# Patient Record
Sex: FEMALE | Race: WHITE | NOT HISPANIC OR LATINO | Employment: FULL TIME | URBAN - METROPOLITAN AREA
[De-identification: names, ages, dates, MRNs, and addresses within clinical notes are randomized per-mention and may not be internally consistent; named-entity substitution may affect disease eponyms.]

---

## 2017-04-13 ENCOUNTER — HISTORICAL (OUTPATIENT)
Dept: INTERNAL MEDICINE | Facility: CLINIC | Age: 42
End: 2017-04-13

## 2017-05-01 ENCOUNTER — HISTORICAL (OUTPATIENT)
Dept: ADMINISTRATIVE | Facility: HOSPITAL | Age: 42
End: 2017-05-01

## 2017-05-03 ENCOUNTER — HISTORICAL (OUTPATIENT)
Dept: SURGERY | Facility: HOSPITAL | Age: 42
End: 2017-05-03

## 2017-05-03 LAB
B-HCG SERPL QL: NEGATIVE
INR PPP: 0.96 (ref 0.9–1.2)
PROTHROMBIN TIME: 12.6 SECOND(S) (ref 11.9–14.4)

## 2017-06-07 ENCOUNTER — INITIAL CONSULT (OUTPATIENT)
Dept: INTERVENTIONAL RADIOLOGY/VASCULAR | Facility: CLINIC | Age: 42
End: 2017-06-07
Payer: COMMERCIAL

## 2017-06-07 VITALS
BODY MASS INDEX: 21.16 KG/M2 | HEIGHT: 65 IN | DIASTOLIC BLOOD PRESSURE: 90 MMHG | SYSTOLIC BLOOD PRESSURE: 131 MMHG | WEIGHT: 127 LBS | HEART RATE: 71 BPM

## 2017-06-07 DIAGNOSIS — Z86.718 HISTORY OF DVT OF LOWER EXTREMITY: ICD-10-CM

## 2017-06-07 DIAGNOSIS — Z95.828 PRESENCE OF IVC FILTER: Primary | ICD-10-CM

## 2017-06-07 DIAGNOSIS — K50.90 CROHN'S DISEASE IN REMISSION: ICD-10-CM

## 2017-06-07 PROCEDURE — 99999 PR PBB SHADOW E&M-EST. PATIENT-LVL III: CPT | Mod: PBBFAC,,,

## 2017-06-07 PROCEDURE — 99204 OFFICE O/P NEW MOD 45 MIN: CPT | Mod: S$GLB,,, | Performed by: NURSE PRACTITIONER

## 2017-06-07 RX ORDER — NAPROXEN SODIUM 220 MG/1
81 TABLET, FILM COATED ORAL DAILY
COMMUNITY

## 2017-06-07 NOTE — PROGRESS NOTES
Subjective:       Patient ID: Sofia Vásquez is a 41 y.o. female.    Chief Complaint: Presence of IVC filter; History of Left lower extremity DVT    Patient in today for an initial consult for an IVC filter retrieval. Patient was referred to Dr. Gordillo by Dr. Channing Avalos at [facility] after a failed IVC filter retrieval on 05/03/2017. Patient reports that the IVC filter was placed in April 2014 after she had a lower GI bleed and was subsequently diagnosed with C. Diff and Crohn's disease. She was ill and inactive for 3 months during that time. Her Crohn's was treated with Remicade and Humira for 1 year and has not had any flare ups to date. During the time of her illness, after being in a prolonged after yoga pose for approximately 20 minute she developed severe left LE swelling associated with SOB. She was not anticoagulated at that time due to the lower GI bleeding. She has no complaints today. She currently denies LE swelling, pelvic pain or pressure, SOB or abdominal pain. She is currently on ASA 81 mg daily and has refused the recommended Eliquis therapy after failed IVC filter retrieval attempt last month.She is here today with her mother.       Review of Systems   Constitutional: Negative for activity change, appetite change, chills, fatigue, fever and unexpected weight change.   HENT: Negative.    Eyes: Negative.    Respiratory: Negative for apnea, cough, choking, chest tightness and shortness of breath.    Cardiovascular: Negative for chest pain, palpitations and leg swelling.   Gastrointestinal: Negative for abdominal distention, abdominal pain, blood in stool, constipation, diarrhea, nausea and vomiting.   Endocrine: Negative.    Genitourinary: Negative for difficulty urinating, dyspareunia, dysuria, frequency, menstrual problem, pelvic pain and urgency.   Musculoskeletal: Negative.    Neurological: Negative for dizziness, tremors, seizures, syncope, speech difficulty,  weakness, light-headedness, numbness and headaches.   Hematological: Does not bruise/bleed easily.       Objective:      Physical Exam   Constitutional: She is oriented to person, place, and time. She appears well-developed and well-nourished.   HENT:   Head: Normocephalic and atraumatic.   Eyes: EOM are normal. Pupils are equal, round, and reactive to light.   Neck: Normal range of motion. Neck supple. No JVD present. No tracheal deviation present. No thyromegaly present.   Cardiovascular: Normal rate, regular rhythm, normal heart sounds and intact distal pulses.  Exam reveals no gallop and no friction rub.    No murmur heard.  Pulmonary/Chest: Effort normal and breath sounds normal. She has no wheezes. She has no rales.   Abdominal: Soft. Bowel sounds are normal. She exhibits no distension and no mass. There is no tenderness. There is no guarding.   Musculoskeletal: Normal range of motion.   Lymphadenopathy:     She has no cervical adenopathy.   Neurological: She is alert and oriented to person, place, and time. She has normal reflexes.   Skin: Skin is warm and dry.   Psychiatric: She has a normal mood and affect. Her behavior is normal. Judgment and thought content normal.       Assessment:       1. Presence of IVC filter    2. History of DVT of lower extremity        Plan:     Dr. Gordillo came in to speak with the patient. IVC filter retrieval procedure discussed in detail (diagrams drawn) with the patient including risks, benefits, potential complications, usual pre and post procedure course including the need for general anesthesia, as well as the possible need for overnight hospital stay. Dr. Gordillo is recommending CTA abdomen and pelvis (to assess stenosis of the IVC above the filter) and an US of the BLE veins prior to moving forward with the filter retrieval. Patient will have the imaging done in [place]. Orders entered,signed, and printed and given to the patient. She will mail the imaging discs to IR  once both have been done. Clinic contact information provided. Patient information verified in EPIC.

## 2017-07-06 ENCOUNTER — HISTORICAL (OUTPATIENT)
Dept: GASTROENTEROLOGY | Facility: CLINIC | Age: 42
End: 2017-07-06

## 2017-07-06 LAB
ABS NEUT (OLG): 4.14 X10(3)/MCL (ref 2.1–9.2)
ALBUMIN SERPL-MCNC: 4 GM/DL (ref 3.4–5)
ALBUMIN/GLOB SERPL: 1 RATIO (ref 1–2)
ALP SERPL-CCNC: 92 UNIT/L (ref 45–117)
ALT SERPL-CCNC: 28 UNIT/L (ref 12–78)
APTT PPP: 26.2 SECOND(S) (ref 23.3–37)
AST SERPL-CCNC: 26 UNIT/L (ref 15–37)
BASOPHILS # BLD AUTO: 0.04 X10(3)/MCL
BASOPHILS NFR BLD AUTO: 1 % (ref 0–1)
BILIRUB SERPL-MCNC: 0.4 MG/DL (ref 0.2–1)
BILIRUBIN DIRECT+TOT PNL SERPL-MCNC: <0.1 MG/DL
BILIRUBIN DIRECT+TOT PNL SERPL-MCNC: >0.3 MG/DL
BUN SERPL-MCNC: 12 MG/DL (ref 7–18)
CALCIUM SERPL-MCNC: 9.2 MG/DL (ref 8.5–10.1)
CHLORIDE SERPL-SCNC: 105 MMOL/L (ref 98–107)
CHOLEST SERPL-MCNC: 210 MG/DL
CHOLEST/HDLC SERPL: 2.4 {RATIO} (ref 0–4.4)
CO2 SERPL-SCNC: 28 MMOL/L (ref 21–32)
CREAT SERPL-MCNC: 0.8 MG/DL (ref 0.6–1.3)
EOSINOPHIL # BLD AUTO: 0.13 X10(3)/MCL
EOSINOPHIL NFR BLD AUTO: 2 % (ref 0–5)
ERYTHROCYTE [DISTWIDTH] IN BLOOD BY AUTOMATED COUNT: 13.2 % (ref 11.5–14.5)
GLOBULIN SER-MCNC: 3.8 GM/ML (ref 2.3–3.5)
GLUCOSE SERPL-MCNC: 93 MG/DL (ref 74–106)
HCT VFR BLD AUTO: 39.6 % (ref 35–46)
HDLC SERPL-MCNC: 87 MG/DL
HGB BLD-MCNC: 13 GM/DL (ref 12–16)
IMM GRANULOCYTES # BLD AUTO: 0.01 10*3/UL
IMM GRANULOCYTES NFR BLD AUTO: 0 %
INR PPP: 0.93 (ref 0.9–1.2)
LDLC SERPL CALC-MCNC: 112 MG/DL (ref 0–130)
LYMPHOCYTES # BLD AUTO: 1.35 X10(3)/MCL
LYMPHOCYTES NFR BLD AUTO: 22 % (ref 15–40)
MCH RBC QN AUTO: 29 PG (ref 26–34)
MCHC RBC AUTO-ENTMCNC: 32.8 GM/DL (ref 31–37)
MCV RBC AUTO: 88.2 FL (ref 80–100)
MONOCYTES # BLD AUTO: 0.55 X10(3)/MCL
MONOCYTES NFR BLD AUTO: 9 % (ref 4–12)
NEUTROPHILS # BLD AUTO: 4.14 X10(3)/MCL
NEUTROPHILS NFR BLD AUTO: 67 X10(3)/MCL
PLATELET # BLD AUTO: 346 X10(3)/MCL (ref 130–400)
PMV BLD AUTO: 9.5 FL (ref 7.4–10.4)
POTASSIUM SERPL-SCNC: 4.4 MMOL/L (ref 3.5–5.1)
PROT SERPL-MCNC: 7.8 GM/DL (ref 6.4–8.2)
PROTHROMBIN TIME: 12.3 SECOND(S) (ref 11.9–14.4)
RBC # BLD AUTO: 4.49 X10(6)/MCL (ref 4–5.2)
SODIUM SERPL-SCNC: 140 MMOL/L (ref 136–145)
TRIGL SERPL-MCNC: 56 MG/DL
TSH SERPL-ACNC: 1.33 MIU/L (ref 0.36–3.74)
VLDLC SERPL CALC-MCNC: 11 MG/DL
WBC # SPEC AUTO: 6.2 X10(3)/MCL (ref 4.5–11)

## 2017-07-19 ENCOUNTER — HOSPITAL ENCOUNTER (OUTPATIENT)
Dept: MEDSURG UNIT | Facility: HOSPITAL | Age: 42
End: 2017-07-20
Attending: RADIOLOGY | Admitting: RADIOLOGY

## 2017-07-19 LAB
ABS NEUT (OLG): 8.87 X10(3)/MCL (ref 2.1–9.2)
APTT PPP: 47.7 SECOND(S) (ref 23.3–37)
B-HCG SERPL QL: NEGATIVE
BASOPHILS # BLD AUTO: 0.05 X10(3)/MCL
BASOPHILS NFR BLD AUTO: 0 % (ref 0–1)
CROSSMATCH INTERPRETATION: NORMAL
EOSINOPHIL # BLD AUTO: 0.04 X10(3)/MCL
EOSINOPHIL NFR BLD AUTO: 0 % (ref 0–5)
ERYTHROCYTE [DISTWIDTH] IN BLOOD BY AUTOMATED COUNT: 13.5 % (ref 11.5–14.5)
GROUP & RH: NORMAL
HCT VFR BLD AUTO: 32.6 % (ref 35–46)
HCT VFR BLD AUTO: 33.6 % (ref 35–46)
HGB BLD-MCNC: 10.9 GM/DL (ref 12–16)
HGB BLD-MCNC: 11.1 GM/DL (ref 12–16)
IMM GRANULOCYTES # BLD AUTO: 0.03 10*3/UL
IMM GRANULOCYTES NFR BLD AUTO: 0 %
INR PPP: 1.13 (ref 0.9–1.2)
LYMPHOCYTES # BLD AUTO: 1.09 X10(3)/MCL
LYMPHOCYTES NFR BLD AUTO: 10 % (ref 15–40)
MCH RBC QN AUTO: 29.4 PG (ref 26–34)
MCHC RBC AUTO-ENTMCNC: 33.4 GM/DL (ref 31–37)
MCV RBC AUTO: 87.9 FL (ref 80–100)
MONOCYTES # BLD AUTO: 0.68 X10(3)/MCL
MONOCYTES NFR BLD AUTO: 6 % (ref 4–12)
NEUTROPHILS # BLD AUTO: 8.87 X10(3)/MCL
NEUTROPHILS NFR BLD AUTO: 82 X10(3)/MCL
PLATELET # BLD AUTO: 251 X10(3)/MCL (ref 130–400)
PMV BLD AUTO: 10.1 FL (ref 7.4–10.4)
PRODUCT READY: NORMAL
PROTHROMBIN TIME: 14.3 SECOND(S) (ref 11.9–14.4)
RBC # BLD AUTO: 3.71 X10(6)/MCL (ref 4–5.2)
WBC # SPEC AUTO: 10.8 X10(3)/MCL (ref 4.5–11)

## 2017-07-20 LAB
ABS NEUT (OLG): 9.57 X10(3)/MCL (ref 2.1–9.2)
APTT PPP: 151.7 SECOND(S) (ref 23.3–37)
BASOPHILS # BLD AUTO: 0.04 X10(3)/MCL
BASOPHILS NFR BLD AUTO: 0 % (ref 0–1)
EOSINOPHIL # BLD AUTO: 0.07 X10(3)/MCL
EOSINOPHIL NFR BLD AUTO: 1 % (ref 0–5)
ERYTHROCYTE [DISTWIDTH] IN BLOOD BY AUTOMATED COUNT: 13.6 % (ref 11.5–14.5)
HCT VFR BLD AUTO: 31.3 % (ref 35–46)
HGB BLD-MCNC: 10.3 GM/DL (ref 12–16)
IMM GRANULOCYTES # BLD AUTO: 0.03 10*3/UL
IMM GRANULOCYTES NFR BLD AUTO: 0 %
LYMPHOCYTES # BLD AUTO: 1.42 X10(3)/MCL
LYMPHOCYTES NFR BLD AUTO: 12 % (ref 15–40)
MCH RBC QN AUTO: 29.1 PG (ref 26–34)
MCHC RBC AUTO-ENTMCNC: 32.9 GM/DL (ref 31–37)
MCV RBC AUTO: 88.4 FL (ref 80–100)
MONOCYTES # BLD AUTO: 0.75 X10(3)/MCL
MONOCYTES NFR BLD AUTO: 6 % (ref 4–12)
NEUTROPHILS # BLD AUTO: 9.57 X10(3)/MCL
NEUTROPHILS NFR BLD AUTO: 80 X10(3)/MCL
PLATELET # BLD AUTO: 233 X10(3)/MCL (ref 130–400)
PMV BLD AUTO: 9.7 FL (ref 7.4–10.4)
RBC # BLD AUTO: 3.54 X10(6)/MCL (ref 4–5.2)
WBC # SPEC AUTO: 11.9 X10(3)/MCL (ref 4.5–11)

## 2017-10-10 ENCOUNTER — HISTORICAL (OUTPATIENT)
Dept: INTERNAL MEDICINE | Facility: CLINIC | Age: 42
End: 2017-10-10

## 2017-10-10 LAB
ABS NEUT (OLG): 5.94 X10(3)/MCL (ref 2.1–9.2)
ALBUMIN SERPL-MCNC: 4.8 GM/DL (ref 3.4–5)
ALBUMIN/GLOB SERPL: 1 RATIO (ref 1–2)
ALP SERPL-CCNC: 87 UNIT/L (ref 45–117)
ALT SERPL-CCNC: 33 UNIT/L (ref 12–78)
AST SERPL-CCNC: 36 UNIT/L (ref 15–37)
BASOPHILS # BLD AUTO: 0.06 X10(3)/MCL
BASOPHILS NFR BLD AUTO: 1 % (ref 0–1)
BILIRUB SERPL-MCNC: 0.3 MG/DL (ref 0.2–1)
BILIRUBIN DIRECT+TOT PNL SERPL-MCNC: 0.1 MG/DL
BILIRUBIN DIRECT+TOT PNL SERPL-MCNC: 0.2 MG/DL
BUN SERPL-MCNC: 14 MG/DL (ref 7–18)
CALCIUM SERPL-MCNC: 8.9 MG/DL (ref 8.5–10.1)
CHLORIDE SERPL-SCNC: 104 MMOL/L (ref 98–107)
CO2 SERPL-SCNC: 26 MMOL/L (ref 21–32)
CREAT SERPL-MCNC: 0.8 MG/DL (ref 0.6–1.3)
EOSINOPHIL # BLD AUTO: 0.04 X10(3)/MCL
EOSINOPHIL NFR BLD AUTO: 0 % (ref 0–5)
ERYTHROCYTE [DISTWIDTH] IN BLOOD BY AUTOMATED COUNT: 15.6 % (ref 11.5–14.5)
GLOBULIN SER-MCNC: 3.8 GM/ML (ref 2.3–3.5)
GLUCOSE SERPL-MCNC: 99 MG/DL (ref 74–106)
HCT VFR BLD AUTO: 36.7 % (ref 35–46)
HGB BLD-MCNC: 12.1 GM/DL (ref 12–16)
IMM GRANULOCYTES # BLD AUTO: 0.02 10*3/UL
IMM GRANULOCYTES NFR BLD AUTO: 0 %
INR PPP: 1.07 (ref 0.9–1.2)
LYMPHOCYTES # BLD AUTO: 1.6 X10(3)/MCL
LYMPHOCYTES NFR BLD AUTO: 20 % (ref 15–40)
MCH RBC QN AUTO: 27.6 PG (ref 26–34)
MCHC RBC AUTO-ENTMCNC: 33 GM/DL (ref 31–37)
MCV RBC AUTO: 83.6 FL (ref 80–100)
MONOCYTES # BLD AUTO: 0.53 X10(3)/MCL
MONOCYTES NFR BLD AUTO: 6 % (ref 4–12)
NEUTROPHILS # BLD AUTO: 5.94 X10(3)/MCL
NEUTROPHILS NFR BLD AUTO: 73 X10(3)/MCL
PLATELET # BLD AUTO: 306 X10(3)/MCL (ref 130–400)
PMV BLD AUTO: 10.6 FL (ref 7.4–10.4)
POTASSIUM SERPL-SCNC: 4.1 MMOL/L (ref 3.5–5.1)
PROT SERPL-MCNC: 8.6 GM/DL (ref 6.4–8.2)
PROTHROMBIN TIME: 13.7 SECOND(S) (ref 11.9–14.4)
RBC # BLD AUTO: 4.39 X10(6)/MCL (ref 4–5.2)
SODIUM SERPL-SCNC: 138 MMOL/L (ref 136–145)
WBC # SPEC AUTO: 8.2 X10(3)/MCL (ref 4.5–11)

## 2017-12-21 ENCOUNTER — HISTORICAL (OUTPATIENT)
Dept: INTERNAL MEDICINE | Facility: CLINIC | Age: 42
End: 2017-12-21

## 2017-12-21 LAB
ABS NEUT (OLG): 1.85 X10(3)/MCL (ref 2.1–9.2)
ALBUMIN SERPL-MCNC: 4.2 GM/DL (ref 3.4–5)
ALBUMIN/GLOB SERPL: 1 RATIO (ref 1–2)
ALP SERPL-CCNC: 83 UNIT/L (ref 45–117)
ALT SERPL-CCNC: 30 UNIT/L (ref 12–78)
AST SERPL-CCNC: 27 UNIT/L (ref 15–37)
BASOPHILS # BLD AUTO: 0.05 X10(3)/MCL
BASOPHILS NFR BLD AUTO: 1 % (ref 0–1)
BILIRUB SERPL-MCNC: 0.4 MG/DL (ref 0.2–1)
BILIRUBIN DIRECT+TOT PNL SERPL-MCNC: 0.1 MG/DL
BILIRUBIN DIRECT+TOT PNL SERPL-MCNC: 0.3 MG/DL
BUN SERPL-MCNC: 10 MG/DL (ref 7–18)
CALCIUM SERPL-MCNC: 8.7 MG/DL (ref 8.5–10.1)
CHLORIDE SERPL-SCNC: 106 MMOL/L (ref 98–107)
CHOLEST SERPL-MCNC: 190 MG/DL
CHOLEST/HDLC SERPL: 2 {RATIO} (ref 0–4.4)
CO2 SERPL-SCNC: 27 MMOL/L (ref 21–32)
CREAT SERPL-MCNC: 0.7 MG/DL (ref 0.6–1.3)
EOSINOPHIL # BLD AUTO: 0.1 X10(3)/MCL
EOSINOPHIL NFR BLD AUTO: 3 % (ref 0–5)
ERYTHROCYTE [DISTWIDTH] IN BLOOD BY AUTOMATED COUNT: 16 % (ref 11.5–14.5)
EST. AVERAGE GLUCOSE BLD GHB EST-MCNC: 117 MG/DL
GLOBULIN SER-MCNC: 3.7 GM/ML (ref 2.3–3.5)
GLUCOSE SERPL-MCNC: 92 MG/DL (ref 74–106)
HBA1C MFR BLD: 5.7 % (ref 4.2–6.3)
HCT VFR BLD AUTO: 38.6 % (ref 35–46)
HDLC SERPL-MCNC: 93 MG/DL
HGB BLD-MCNC: 12.8 GM/DL (ref 12–16)
HIV 1+2 AB+HIV1 P24 AG SERPL QL IA: NONREACTIVE
IMM GRANULOCYTES # BLD AUTO: 0.01 10*3/UL
IMM GRANULOCYTES NFR BLD AUTO: 0 %
INR PPP: 1.08 (ref 0.9–1.2)
LDLC SERPL CALC-MCNC: 78 MG/DL (ref 0–130)
LYMPHOCYTES # BLD AUTO: 1.37 X10(3)/MCL
LYMPHOCYTES NFR BLD AUTO: 36 % (ref 15–40)
MCH RBC QN AUTO: 28.2 PG (ref 26–34)
MCHC RBC AUTO-ENTMCNC: 33.2 GM/DL (ref 31–37)
MCV RBC AUTO: 85 FL (ref 80–100)
MONOCYTES # BLD AUTO: 0.43 X10(3)/MCL
MONOCYTES NFR BLD AUTO: 11 % (ref 4–12)
NEUTROPHILS # BLD AUTO: 1.85 X10(3)/MCL
NEUTROPHILS NFR BLD AUTO: 48 X10(3)/MCL
PLATELET # BLD AUTO: 363 X10(3)/MCL (ref 130–400)
PMV BLD AUTO: 9.8 FL (ref 7.4–10.4)
POTASSIUM SERPL-SCNC: 4.4 MMOL/L (ref 3.5–5.1)
PROT SERPL-MCNC: 7.9 GM/DL (ref 6.4–8.2)
PROTHROMBIN TIME: 13.8 SECOND(S) (ref 11.9–14.4)
RBC # BLD AUTO: 4.54 X10(6)/MCL (ref 4–5.2)
SODIUM SERPL-SCNC: 141 MMOL/L (ref 136–145)
TRIGL SERPL-MCNC: 94 MG/DL
TSH SERPL-ACNC: 1.14 MIU/L (ref 0.36–3.74)
VLDLC SERPL CALC-MCNC: 19 MG/DL
WBC # SPEC AUTO: 3.8 X10(3)/MCL (ref 4.5–11)

## 2019-09-16 ENCOUNTER — HISTORICAL (OUTPATIENT)
Dept: LAB | Facility: HOSPITAL | Age: 44
End: 2019-09-16

## 2019-10-01 LAB — POC BETA-HCG (QUAL): NEGATIVE

## 2019-10-22 ENCOUNTER — HISTORICAL (OUTPATIENT)
Dept: ADMINISTRATIVE | Facility: HOSPITAL | Age: 44
End: 2019-10-22

## 2019-10-22 LAB
ABS NEUT (OLG): 6.54 X10(3)/MCL (ref 2.1–9.2)
ALBUMIN SERPL-MCNC: 4 GM/DL (ref 3.4–5)
ALBUMIN/GLOB SERPL: 0.9 RATIO (ref 1.1–2)
ALP SERPL-CCNC: 94 UNIT/L (ref 45–117)
ALT SERPL-CCNC: 20 UNIT/L (ref 12–78)
AST SERPL-CCNC: 15 UNIT/L (ref 15–37)
BASOPHILS # BLD AUTO: 0.1 X10(3)/MCL (ref 0–0.2)
BASOPHILS NFR BLD AUTO: 1 %
BILIRUB SERPL-MCNC: 0.2 MG/DL (ref 0.2–1)
BILIRUBIN DIRECT+TOT PNL SERPL-MCNC: <0.1 MG/DL (ref 0–0.2)
BILIRUBIN DIRECT+TOT PNL SERPL-MCNC: ABNORMAL MG/DL
BUN SERPL-MCNC: 9 MG/DL (ref 7–18)
CALCIUM SERPL-MCNC: 8.9 MG/DL (ref 8.5–10.1)
CHLORIDE SERPL-SCNC: 106 MMOL/L (ref 98–107)
CO2 SERPL-SCNC: 28 MMOL/L (ref 21–32)
CREAT SERPL-MCNC: 0.8 MG/DL (ref 0.6–1.3)
CRP SERPL-MCNC: <0.3 MG/DL
DEPRECATED CALCIDIOL+CALCIFEROL SERPL-MC: 56.22 NG/ML (ref 30–80)
EOSINOPHIL # BLD AUTO: 0.3 X10(3)/MCL (ref 0–0.9)
EOSINOPHIL NFR BLD AUTO: 3 %
ERYTHROCYTE [DISTWIDTH] IN BLOOD BY AUTOMATED COUNT: 12.4 % (ref 11.5–14.5)
FERRITIN SERPL-MCNC: 19 NG/ML (ref 10–150)
FOLATE SERPL-MCNC: 41.1 NG/ML (ref 3.1–17.5)
GLOBULIN SER-MCNC: 4.4 GM/ML (ref 2.3–3.5)
GLUCOSE SERPL-MCNC: 83 MG/DL (ref 74–106)
HAV AB SER QL IA: NONREACTIVE
HBV SURFACE AG SERPL QL IA: NEGATIVE
HCT VFR BLD AUTO: 41.6 % (ref 35–46)
HGB BLD-MCNC: 13.4 GM/DL (ref 12–16)
IMM GRANULOCYTES # BLD AUTO: 0.03 10*3/UL
IMM GRANULOCYTES NFR BLD AUTO: 0 %
IRON SATN MFR SERPL: 16.5 % (ref 15–50)
IRON SERPL-MCNC: 60 MCG/DL (ref 50–170)
LYMPHOCYTES # BLD AUTO: 1.7 X10(3)/MCL (ref 0.6–4.6)
LYMPHOCYTES NFR BLD AUTO: 18 %
MCH RBC QN AUTO: 30.9 PG (ref 26–34)
MCHC RBC AUTO-ENTMCNC: 32.2 GM/DL (ref 31–37)
MCV RBC AUTO: 95.9 FL (ref 80–100)
MONOCYTES # BLD AUTO: 0.7 X10(3)/MCL (ref 0.1–1.3)
MONOCYTES NFR BLD AUTO: 8 %
NEG CONT SPOT COUNT: NORMAL
NEUTROPHILS # BLD AUTO: 6.54 X10(3)/MCL (ref 2.1–9.2)
NEUTROPHILS NFR BLD AUTO: 70 %
PANEL A SPOT COUNT: 0
PANEL B SPOT COUNT: 0
PLATELET # BLD AUTO: 444 X10(3)/MCL (ref 130–400)
PMV BLD AUTO: 9.2 FL (ref 7.4–10.4)
POS CONT SPOT COUNT: NORMAL
POTASSIUM SERPL-SCNC: 4 MMOL/L (ref 3.5–5.1)
PROT SERPL-MCNC: 8.4 GM/DL (ref 6.4–8.2)
RBC # BLD AUTO: 4.34 X10(6)/MCL (ref 4–5.2)
SODIUM SERPL-SCNC: 137 MMOL/L (ref 136–145)
T-SPOT.TB: NORMAL
TIBC SERPL-MCNC: 363 MCG/DL (ref 250–450)
TRANSFERRIN SERPL-MCNC: 294 MG/DL (ref 200–360)
VIT B12 SERPL-MCNC: 495 PG/ML (ref 193–986)
WBC # SPEC AUTO: 9.3 X10(3)/MCL (ref 4.5–11)

## 2019-10-24 ENCOUNTER — HISTORICAL (OUTPATIENT)
Dept: GASTROENTEROLOGY | Facility: CLINIC | Age: 44
End: 2019-10-24

## 2019-10-24 LAB — C DIFF INTERP: NEGATIVE

## 2019-10-29 ENCOUNTER — HISTORICAL (OUTPATIENT)
Dept: GASTROENTEROLOGY | Facility: CLINIC | Age: 44
End: 2019-10-29

## 2019-10-31 LAB — FINAL CULTURE: NORMAL

## 2019-11-27 ENCOUNTER — HISTORICAL (OUTPATIENT)
Dept: ENDOSCOPY | Facility: HOSPITAL | Age: 44
End: 2019-11-27

## 2022-04-12 ENCOUNTER — HISTORICAL (OUTPATIENT)
Dept: ADMINISTRATIVE | Facility: HOSPITAL | Age: 47
End: 2022-04-12
Payer: COMMERCIAL

## 2022-04-30 VITALS
DIASTOLIC BLOOD PRESSURE: 94 MMHG | SYSTOLIC BLOOD PRESSURE: 143 MMHG | OXYGEN SATURATION: 100 % | WEIGHT: 121.25 LBS | BODY MASS INDEX: 20.2 KG/M2 | HEIGHT: 65 IN

## 2022-04-30 NOTE — H&P
* Final Report *   Document Contains Addenda  Chief Complaint follow up appt; c/o bp being high History of Present Illness 41 y.o  female, PHMx of DVT s/p IVC filter (2014) and Crohn's (2014) seen in clinic today for routine follow-up appointment. Patient has no current complaints. In 2014, she contracted Cdiff colitis and was admitted for treatment. She had numerous episodes of bright red bleeding per rectum at that time, colonoscopy was performed. A diagnosis of Crohn's disease was made and she was started on Remicaid. She took Remicaid for a year and has had no flare-ups of her Crohn's over the past three years. During this hospitalization in 2014, she also was worked up for a provoked left lower extremity DVT and an IVC filter was placed.  She states that she has had no recurrent clots since then and the decision to remove her IVC filter was made. 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 Repeat retrieval procedure is on July 19th.   Her blood pressure last few visits has been slightly high, she states IR explained to her that this is likely due to the IVC stenosis and should resolve post IVC retrieval. Currently taking no medications for her blood pressure.    Meds: 81mg ASA  All: NKDA  Surgica Hx: IVC filter placement (2014); exploratory laparatomy (1996)  FMHx: Mother: HTN; Father: lung cancer 2014  SHx:  - smoked 1-2 cig/ day at age [age]y.o  - EtOH: 1-2 beers/ month  - Drugs: no illicit or recreational drug use  - Single, lives with boyfriend. Sexually active, no condoms or birth control. She does not have children. No past STDs, states she was tested in 2014 and results were normal.  - Employed as a . Active lifestyle.  - Education: bachelor's degree.  Review of Systems   Constitutional : No Fever, No chills, No sweats, No weakness or fatigue.  Eye : No  recent vision problems, no blurred vision or double vision.  ENMT : No sore throat, no nasal congestion.  Respiratory : No Shortness of breath, no orthopnea, no cough, no hemoptysis.  Cardiovascular : No chest pain, no palpitations, no syncope, no peripheral edema.  Gastrointestinal : No Abdominal pain, moderate, No nausea, No vomiting, No diarrhea.  Genitourinary No dysuria, no hematuria, No frequency or urgency.  Hematologic/lymphatic: No bruising/bleeding tendency.  Musculoskeletal : No Joint pain, No muscle pain, No claudication.  Neurologic : No headache, no dizziness, No focal weakness, No tingling or numbness.  Psychiatric : No anxiety, no depression Physical Exam   Vitals & Measurements T: 36.9 °C (Oral) HR: 80 (Peripheral) BP: 138/82 HT: 165.1 cm HT: 165.1 cm HT: 165.1 cm WT: 58.9 kg WT: 58.9 kg WT: 58.9 kg BMI: 21.61 GENERAL: not in acute distress, appears comfortable  HEENT: NCAT, no icterus, PERRL, no nasal discharge, moist mucous membranes, no LAD  CVS: NSR, no murmurs/ rubs/ gallops, no JVD, normal distal pulses, normal capillary refill, no peripheral edema, no calf tenderness  RESP: Clear to ausculataion, bilateral breath sounds, no wheezes, rales or rhonchi. Normal respiratory effort  GI: soft, non tender, non distended, normal bowel sounds, no hepatomegaly, no splenomegaly  MSK: normal ROM, bilateral strength  Neuro: aware, oriented x3  Strength: normal ROM, strength 5/5 Assessment/Plan 1. Crohn's disease  - in remission since 2014  - no active medications  - no complaints  - colonoscopy in 2014, GI to repeat colonoscopy in 2-3 years    2. DVT  - s/p IVC filter placement 2014  - due to remove filter July 19th; previous attempt to remove wasn't successful d/t significant stenosis.  - currently on ASA 81mg  - to follow-up in clinic in 4 weeks    3. HTN  - mildly elevated BPs (140s/ 90s)  - no current medications  - to address BP after IVC filter removal.    Today:  - TDAP vaccine  - will check A1C at  next visit  - counselled on HTN and cholesterol managment         Problem List/Past Medical History Crohns disease DVT (deep venous thrombosis) Historical Clostridium difficile Procedure/Surgical History Retrieval (removal) of intravascular vena cava filter, endovascular approach including vascular access, vessel selection, and radiological supervision and interpretation, intraprocedural roadmapping, and imaging guidance (ultrasound and fluoroscopy), when (05/03/2017), S/P insertion of IVC (inferior vena caval) filter (04/30/2014), Filter (04/29/2014), Status post laparoscopy (1996), Laparoscope (1993). Medications aspirin 81 mg oral tablet, 81 mg, 1 tab(s), Oral, Daily BACLOFEN 10 MG TABLET, 10 mg, 1 tab(s), Oral, TID, Not taking DICLOFENAC SOD EC 75 MG TAB, 75 mg, 1 tab(s), Oral, BID, Not taking Eliquis 2.5 mg oral tablet, 2.5 mg, 1 tab(s), Oral, BID, 1 refills, Not taking OFLOXACIN 0.3% EAR DROPS, TOP, Not Taking, Completed Rx Allergies No Known Allergies Social History   Alcohol - Denies Alcohol Use   Current, Beer, 1-2 times per month   Employment/School   Unemployed   Home/Environment   Lives with Mother.   Nutrition/Health   Low Residue, High Protien   Substance Abuse - Denies Substance Abuse   Never   Tobacco - Denies Tobacco Use   Former smoker, Cigarettes, 10 per day. Stopped age 20 Years.   Never smoker Family History Hypertension.: Mother. Primary malignant neoplasm of lung: Father.   Addendum by Jojo Hdz MD on July 17, 2017 09:58:41 CDT (Verified)  Patient seen and examined with resident. History of provoked DVT treated with aspirin and filter secondary to GI bleed during event. Scheduled to have filter removed this week. Agree with management plan as documented.  Result type: Office/Clinic Note-Physician   Result date: July 17, 2017 8:52 CDT   Result status: Modified   Result title: [facility]   Performed by: Tracy Lemon MD on July 17, 2017 8:56 CDT   Verified by: Tracy Lemon MD on  July 17, 2017 9:49 CDT   Encounter info: [id number], [facility], Clinic Visit, 7/17/2017 - 7/17/2017

## 2022-04-30 NOTE — DISCHARGE SUMMARY
* Final Report *  DISCHARGE NOTE     Patient:   Sofia Vásquez            MRN: [id number]            FIN: [id number]               Age:   41 years     Sex:  Female     :  1975   Associated Diagnoses:   None   Author:   Channing Avalos MD      DISCHARGE NOTE FROM OBSERVATION STATUS    Ms. Vásquez required overnight observation after the very prolonged procedure performed on her yesterday (failed attempt of IVC filter removal plus balloon angioplasty of the IVC), which was completed after 1700 hours.  Additionally, patient required IV anticoagulation to prevent extension os non occlusive thrombi noted inside the existing IVC filter, after the procedure.    Based on lab results, her IV heparinization was stopped at nearly 0800 hours this morning, when the PTT result was 151 seconds, up from initial 49 seconds immediately after the procedure.     Ms. Vásquez had no acute events overnight, her urine output was normal, tolerated PO, and her vital signs were normal, except for one single episode of elevated temperature before midnight. Her physical examination was unremarkable this morning.    Patient herself removed the neck dressing, which was intact, and I removed the groin dressing, which was also intact.    I had an extensive discussion with patient regarding her IR clinic return appointment (first week of August), and also in relation to the oral anticoagulant (Eliquis), which had been prescribed by IM since May 2017. At that time, patient refused to take the Eliquis, but now she is willing to take it, but she was instructed to stop the ASA, 81 mg, she has been for the past few months.    Ms. Rudd, her RN, was in the room with me during this final assessment prior to discharge.      Result type: Postoperative Note  Result date: 2017 13:56 CDT  Result status: Auth (Verified)  Result title: DISCHARGE NOTE  Performed by: Channing Avalos MD on 2017 14:13 CDT  Verified  by: Channing Avalos MD on July 20, 2017 14:13 CDT  Encounter info: [id number], [facility], Observation, 7/19/2017 - 7/20/2017    Doc has been moved by HIM specialist to the correct doc.

## 2022-04-30 NOTE — H&P
Patient:   Sofia Vásquez            MRN: [id number]            FIN: [id number]               Age:   41 years     Sex:  Female     :  1975   Associated Diagnoses:   None   Author:   Brandon BERMAN [facility]      Medicine on call was consulted. Patient underwent removal of ICV filter, but was not removed due to stenosis. Anticoagulation was recommended to prevent IVC thrombosis. Spoke to patient. Case management was consulted. Per case management- Eliquis is covered by insurance. Prescription was given to patient.

## 2022-05-05 NOTE — HISTORICAL [facility] CERNER
This is a historical note converted from [employer]. Formatting and pictures may have been removed.  Please reference [facility] for original formatting and attached multimedia. Chief Complaint  Colonoscopy  History of Present Illness  44 year old female diagnosed with Crohns disease in 2014 presenting for screening colonoscopy. She was on one year of Remicade and went into remission, this lasted until recently when she started having blood in her stools. She has had about 5 lbs of weight loss over the last several months. She also changed probiotics recently and attributes this to resolution of her bloody BMs.  Review of Systems  Review of systems negative?for?CP, SOB, f/c, n/v  Physical Exam  Vitals & Measurements  WT:?56.1?kg? BMI:?20.61?  General: NAD  Neuro: AOx3, speech intact  HEENT: NCAT  Heart: regular rate, extremities well perfused  Lungs: unlabored breathing on room air  Abd: s/nt/nd, no surgical scars  Ext: no edema, 2+ radial pulse b/l  Skin: no?rashes present  Assessment/Plan  44 year old female with Crohns presenting for screenign colonoscopy.  -RBA of procedure discussed, she elects to proceed   Problem List/Past Medical History  Ongoing  Crohn disease of colon  Historical  Clostridium difficile  Procedure/Surgical History  ivc filter removed (11/08/2017)  Removal of Intraluminal Device from Lower Vein, Percutaneous Approach (07/19/2017)  Retrieval (removal) of intravascular vena cava filter, endovascular approach including vascular access, vessel selection, and radiological supervision and interpretation, intraprocedural roadmapping, and imaging guidance (ultrasound and fluoroscopy), when (07/19/2017)  Retrieval (removal) of intravascular vena cava filter, endovascular approach including vascular access, vessel selection, and radiological supervision and interpretation, intraprocedural roadmapping, and imaging guidance (ultrasound and fluoroscopy), when (05/03/2017)  S/P insertion of IVC (inferior vena  caval) filter (04/30/2014)  Filter (04/29/2014)  Status post laparoscopy (1996)  Laparoscope (1993)   Medications  Inpatient  buffered lidocaine 2% - 0.5 ml syringe, 10 mg= 0.5 mL, Subcutaneous, As Directed  IVF Lactated Ringers LR Infusion 1,000 mL, 1000 mL, IV  Home  atenolol 25 mg oral tablet, 25 mg= 1 tab(s), Oral, Daily  Flonase 50 mcg/inh nasal spray, 1 spray(s), Nasal, BID  Xyzal 5 mg oral tablet, 5 mg= 1 tab(s), Oral, qPM  Allergies  No Known Allergies  Social History  Abuse/Neglect  No, 11/27/2019  Alcohol - Denies Alcohol Use, 06/18/2014  Current, Beer, 1-2 times per month, 04/01/2017  Employment/School  Part time, Work/School description: ., 09/11/2017  Exercise  Self assessment: Good condition., 07/09/2018  Exercise frequency: Daily. Exercise type: Aerobics, Yoga., 09/11/2017  Home/Environment  Lives with Mother., 06/18/2014  Nutrition/Health  Type of diet: low sodium., 07/09/2018  Regular, 09/11/2017  Sexual  Sexually active: Yes. Number of current partners 1., 07/09/2018  Substance Use - Denies Substance Abuse, 06/18/2014  Never, 04/01/2017  Tobacco - Denies Tobacco Use, 06/18/2014  Never (less than 100 in lifetime), No, 11/27/2019  Family History  Hypertension.: Mother.  Primary malignant neoplasm of lung: Father.  Immunizations  Vaccine Date Status   influenza virus vaccine, inactivated 10/22/2019 Given   tetanus/diphtheria/pertussis, acel(Tdap) 07/17/2017 Given

## 2022-05-05 NOTE — HISTORICAL [facility] CERNER
This is a historical note converted from [place]. Formatting and pictures may have been removed.  Please reference [facility] for original formatting and attached multimedia. History of Present Illness  Ms. Vásquez is a 44 year old CF with IBD (Crohns colitis) diagnosed in 2014 on no therapy?here for a colonoscopy.  ?   Back in 2014 she developed diarrhea, hematochezia, weight loss.? She recalls being diagnosed with c difficile and treated with metronidazole.? This did not help and she was subsequently given po vancomycin.? She continued to have symptoms and colonoscopy was performed in March 2014.? Per written record, the TI was normal and there was diffuse erythema and edema with?patchy deep ulcerations from the rectum to cecum.? Diagnosis of Crohns colitis was made.? She did have an EGD as well?in?April 2014 that described antral erythema and erosions in the duodenum.? ?I do not have pathology from these procedures.? She recalls being placed on steroids and sulfasalazine.  ?   She was seen in follow-up in?May 2014 and started on Remicade.? Her symptoms improved on Remicade and was dosed on 5mg/kg every 8 weeks for monotherapy.? She remained on the medication for one year then stopped the medication after discussing with GI NP at that time.?  ?   She remained asymptomatic until this past July when she began experiencing bloody diarrhea.? She was having several bowel movements during the day and at night.? In addition she would experience abdominal cramping with BMs, urgency and tenesmus.? She has lost 5 lbs since symptoms started.? Today she states that stools are formed but she continues to have rectal bleeding with every BM.? In addition she will go to the bathroom usually 8-9 times during the day but will often only pass mucus and blood.? She has urgency and tenesmus still.?  ?   She does not smoke or drink any alcohol.? She denies any NSAID use.? There is no family history of IBD.  ?   Recent bloodwork on  09/21/2019 showed normal CMP, Hgb 11.7 g/dL, normal WBC, fecal calprotectin 425.  ?   During her illness back in 2014 she did experience 2 DVTs despite anticoagulation and was given an IVC filter.? Attempts to remove filter he failed x 2 and she went to [place] to get it removed in 2017.  ?  ?  IBD History:  IBD disease: colitis (Crohns vs UC)  Disease location: Colon  Disease behavior: n/a  ?  Current therapy:?none  ?  Prior therapy:  Prednisone  Sulfasalazine  Remicade 5mg/kg every 8 weeks (last 2015)  ?  Surgeries: none  ?  Complications: none  ?  Extraintestinal manifestations:  none  Review of Systems  Comprehensive Review of Systems performed with no exceptions other than as noted in HPI.  ?  Physical Exam  Vitals & Measurements  WT:?56.1?kg? BMI:?20.61?  General:?well-developed well-nourished in no acute distress  Eye: PERRLA, EOMI, clear conjunctiva  HENT:? oropharynx without erythema/exudate, oropharynx and nasal mucosal surfaces moist  Neck:? no thyromegaly or lymphadenopathy, trachea midline  Respiratory:?symmetrical chest expansion and respiratory effort, clear to auscultation bilaterally  Cardiovascular:?regular rate and rhythm without murmurs, gallops or rubs  Gastrointestinal:?soft, non-tender, non-distended with normal bowel sounds, without masses to palpation  Integumentary: no rashes or skin lesions present  Neurologic: cranial nerves intact, no asterixis, awake, alert, and oriented  Psych: good insight, appropriate mood, normal affect  ?  Assessment/Plan  Ms. Vásquez is a 44 year old CF with IBD (Crohns colitis) diagnosed in 2014 on no therapy?here for a colonoscopy.  ?   Risks, benefits, and alternatives of the procedure discussed.?  Will proceed with endoscopic procedure as scheduled.   Problem List/Past Medical History  Ongoing  Crohn disease of colon  Historical  Clostridium difficile  Procedure/Surgical History  ivc filter removed (11/08/2017)  Removal of Intraluminal Device from Lower Vein,  Percutaneous Approach (07/19/2017)  Retrieval (removal) of intravascular vena cava filter, endovascular approach including vascular access, vessel selection, and radiological supervision and interpretation, intraprocedural roadmapping, and imaging guidance (ultrasound and fluoroscopy), when (07/19/2017)  Retrieval (removal) of intravascular vena cava filter, endovascular approach including vascular access, vessel selection, and radiological supervision and interpretation, intraprocedural roadmapping, and imaging guidance (ultrasound and fluoroscopy), when (05/03/2017)  S/P insertion of IVC (inferior vena caval) filter (04/30/2014)  Filter (04/29/2014)  Status post laparoscopy (1996)  Laparoscope (1993)   Medications  Inpatient  buffered lidocaine 2% - 0.5 ml syringe, 10 mg= 0.5 mL, Subcutaneous, As Directed  IVF Lactated Ringers LR Infusion 1,000 mL, 1000 mL, IV  Home  atenolol 25 mg oral tablet, 25 mg= 1 tab(s), Oral, Daily  Flonase 50 mcg/inh nasal spray, 1 spray(s), Nasal, BID  Xyzal 5 mg oral tablet, 5 mg= 1 tab(s), Oral, qPM  Allergies  No Known Allergies  Social History  Abuse/Neglect  No, 11/27/2019  Alcohol - Denies Alcohol Use, 06/18/2014  Current, Beer, 1-2 times per month, 04/01/2017  Employment/School  Part time, Work/School description: ., 09/11/2017  Exercise  Self assessment: Good condition., 07/09/2018  Exercise frequency: Daily. Exercise type: Aerobics, Yoga., 09/11/2017  Home/Environment  Lives with Mother., 06/18/2014  Nutrition/Health  Type of diet: low sodium., 07/09/2018  Regular, 09/11/2017  Sexual  Sexually active: Yes. Number of current partners 1., 07/09/2018  Substance Use - Denies Substance Abuse, 06/18/2014  Never, 04/01/2017  Tobacco - Denies Tobacco Use, 06/18/2014  Never (less than 100 in lifetime), No, 11/27/2019  Family History  Hypertension.: Mother.  Primary malignant neoplasm of lung: Father.  Immunizations  Vaccine Date Status   influenza virus vaccine,  inactivated 10/22/2019 Given   tetanus/diphtheria/pertussis, acel(Tdap) 07/17/2017 Given

## 2022-09-22 ENCOUNTER — HISTORICAL (OUTPATIENT)
Dept: ADMINISTRATIVE | Facility: HOSPITAL | Age: 47
End: 2022-09-22
Payer: COMMERCIAL